# Patient Record
Sex: MALE | Race: OTHER | HISPANIC OR LATINO | Employment: UNEMPLOYED | URBAN - METROPOLITAN AREA
[De-identification: names, ages, dates, MRNs, and addresses within clinical notes are randomized per-mention and may not be internally consistent; named-entity substitution may affect disease eponyms.]

---

## 2024-10-30 ENCOUNTER — HOSPITAL ENCOUNTER (EMERGENCY)
Facility: HOSPITAL | Age: 39
Discharge: HOME | End: 2024-10-30
Attending: EMERGENCY MEDICINE

## 2024-10-30 VITALS
HEIGHT: 68 IN | TEMPERATURE: 100 F | RESPIRATION RATE: 16 BRPM | DIASTOLIC BLOOD PRESSURE: 80 MMHG | BODY MASS INDEX: 27.28 KG/M2 | SYSTOLIC BLOOD PRESSURE: 138 MMHG | WEIGHT: 180 LBS | OXYGEN SATURATION: 98 % | HEART RATE: 100 BPM

## 2024-10-30 DIAGNOSIS — J18.9 PNEUMONIA OF LEFT LOWER LOBE DUE TO INFECTIOUS ORGANISM: Primary | ICD-10-CM

## 2024-10-30 DIAGNOSIS — R05.8 PRODUCTIVE COUGH: ICD-10-CM

## 2024-10-30 LAB
GROUP A STREP, MOLECULAR: NEGATIVE
INFLUENZA A, MOLECULAR: NEGATIVE
INFLUENZA B, MOLECULAR: NEGATIVE
SARS-COV-2 RDRP RESP QL NAA+PROBE: NEGATIVE
SPECIMEN SOURCE: NORMAL

## 2024-10-30 PROCEDURE — 25000003 PHARM REV CODE 250

## 2024-10-30 PROCEDURE — 87635 SARS-COV-2 COVID-19 AMP PRB: CPT

## 2024-10-30 PROCEDURE — 99284 EMERGENCY DEPT VISIT MOD MDM: CPT | Mod: 25

## 2024-10-30 PROCEDURE — 87651 STREP A DNA AMP PROBE: CPT

## 2024-10-30 PROCEDURE — 87502 INFLUENZA DNA AMP PROBE: CPT

## 2024-10-30 RX ORDER — DOXYCYCLINE 100 MG/1
100 CAPSULE ORAL 2 TIMES DAILY
Qty: 10 CAPSULE | Refills: 0 | Status: SHIPPED | OUTPATIENT
Start: 2024-10-30 | End: 2024-11-04

## 2024-10-30 RX ORDER — IBUPROFEN 200 MG
600 TABLET ORAL
Status: COMPLETED | OUTPATIENT
Start: 2024-10-30 | End: 2024-10-30

## 2024-10-30 RX ORDER — ACETAMINOPHEN 325 MG/1
650 TABLET ORAL
Status: COMPLETED | OUTPATIENT
Start: 2024-10-30 | End: 2024-10-30

## 2024-10-30 RX ORDER — AMOXICILLIN 500 MG/1
1000 CAPSULE ORAL EVERY 8 HOURS
Qty: 30 CAPSULE | Refills: 0 | Status: SHIPPED | OUTPATIENT
Start: 2024-10-30 | End: 2024-10-30

## 2024-10-30 RX ORDER — DOXYCYCLINE 100 MG/1
100 CAPSULE ORAL 2 TIMES DAILY
Qty: 10 CAPSULE | Refills: 0 | Status: SHIPPED | OUTPATIENT
Start: 2024-10-30 | End: 2024-10-30

## 2024-10-30 RX ORDER — AMOXICILLIN 500 MG/1
1000 CAPSULE ORAL EVERY 8 HOURS
Qty: 30 CAPSULE | Refills: 0 | Status: SHIPPED | OUTPATIENT
Start: 2024-10-30 | End: 2024-11-04

## 2024-10-30 RX ADMIN — IBUPROFEN 600 MG: 200 TABLET, FILM COATED ORAL at 04:10

## 2024-10-30 RX ADMIN — ACETAMINOPHEN 650 MG: 325 TABLET ORAL at 06:10

## 2024-10-30 NOTE — Clinical Note
"Lars Reynolds" Gregg Lemons was seen and treated in our emergency department on 10/30/2024.  He may return to work on 11/09/2024.       If you have any questions or concerns, please don't hesitate to call.      Vesta Bustillo PA-C"

## 2024-10-30 NOTE — DISCHARGE INSTRUCTIONS
Regrese al departamento de emergencias si no controla la fiebre, presenta dificultad para respirar o dolor en el pecho.  Inola jasper antibióticos según lo recetado. puede realizar un seguimiento con alvarez proveedor de atención primaria para garantizar la resolución de los síntomas.  Si no tiene osmany, he adjuntado un número de teléfono a alvarez documentación de adi.

## 2024-10-31 NOTE — ED PROVIDER NOTES
Encounter Date: 10/30/2024       History     Chief Complaint   Patient presents with    Fever     Fever off and on since last week. Coughing as well     Cough     39-year-old male no significant past medical history presents to the emergency department for intermittent fever with a cough since last week.  Patient states that in the beginning he was coughing up green but now it does not seem to have a color.  Reports that he did not actually check his temperature but that he felt very hot and had cold sweats.  He has not had any nausea, vomiting, diarrhea, blood in his stool, recent travel, sick contacts, shortness of breath, chest pain        Review of patient's allergies indicates:  No Known Allergies  No past medical history on file.  No past surgical history on file.  No family history on file.     Review of Systems   Constitutional:  Positive for fever.   HENT:  Positive for sore throat.    Respiratory:  Positive for cough.    Gastrointestinal: Negative.    Genitourinary: Negative.    Musculoskeletal: Negative.    Neurological: Negative.        Physical Exam     Initial Vitals [10/30/24 1607]   BP Pulse Resp Temp SpO2   (!) 160/88 75 18 (!) 100.6 °F (38.1 °C) 99 %      MAP       --         Physical Exam    Vitals reviewed.  Constitutional: He appears well-developed and well-nourished. He is not diaphoretic. No distress.   HENT:   Right Ear: External ear normal.   Left Ear: External ear normal. Mouth/Throat: Oropharynx is clear and moist. No oropharyngeal exudate.   Eyes: Conjunctivae and EOM are normal. Pupils are equal, round, and reactive to light.   Neck:   Normal range of motion.  Cardiovascular:  Normal rate, regular rhythm, normal heart sounds and intact distal pulses.           Pulmonary/Chest: Breath sounds normal. No respiratory distress. He has no wheezes. He has no rhonchi. He has no rales.   Abdominal: Abdomen is soft. He exhibits no distension. There is no abdominal tenderness. There is no rebound  and no guarding.   Musculoskeletal:         General: No tenderness or edema. Normal range of motion.      Cervical back: Normal range of motion.     Lymphadenopathy:     He has cervical adenopathy.   Neurological: He is alert and oriented to person, place, and time. He has normal strength. GCS score is 15. GCS eye subscore is 4. GCS verbal subscore is 5. GCS motor subscore is 6.   Skin: Capillary refill takes less than 2 seconds.         ED Course   Procedures  Labs Reviewed   GROUP A STREP, MOLECULAR       Result Value    Group A Strep, Molecular Negative     SARS-COV-2 RNA AMPLIFICATION, QUAL    SARS-CoV-2 RNA, Amplification, Qual Negative     INFLUENZA A AND B ANTIGEN    Influenza A, Molecular Negative      Influenza B, Molecular Negative      Flu A & B Source Nasal swab      Narrative:     Specimen Source->Nasopharyngeal Swab          Imaging Results              X-Ray Chest PA And Lateral (Final result)  Result time 10/30/24 17:29:07      Final result by Jaison Valero Jr., MD (10/30/24 17:29:07)                   Impression:      Left lingular infiltrate consistent with pneumonia.      Electronically signed by: Jaison Valero MD  Date:    10/30/2024  Time:    17:29               Narrative:    EXAMINATION:  XR CHEST PA AND LATERAL    CLINICAL HISTORY:  Other specified cough    TECHNIQUE:  PA and lateral views of the chest were performed.    COMPARISON:  None    FINDINGS:  There is a left lingular soft tissue density consistent with pneumonia.  The cardiac size is within normal limits.  The right lung is clear.  No pneumothorax or pleural effusion is noted.                                       Medications   ibuprofen tablet 600 mg (600 mg Oral Given 10/30/24 1643)   acetaminophen tablet 650 mg (650 mg Oral Given 10/30/24 1810)     Medical Decision Making  39-year-old male no significant past medical history presents to the emergency department for intermittent fever with a cough since last week.  Patient  states that in the beginning he was coughing up green but now it does not seem to have a color.  Reports that he did not actually check his temperature but that he felt very hot and had cold sweats.  He has not had any nausea, vomiting, diarrhea, blood in his stool, recent travel, sick contacts, shortness of breath, chest pain    Considerations include but not limited to COVID, influenza, strep, viral illness, pneumonia, bronchitis    Vitals stable.  Temp at 100.6°.  Patient given a dose of Tylenol and ibuprofen here in the emergency department.  Temp at discharge was 99.8.  Oropharynx was clear and moist.  Nonerythematous and without exudate.  Airway was patent.  No swelling noted in the oropharynx.  Tympanic membranes visualized bilaterally without bulging or erythema.  Normal S1, S2.  Lungs with a vesicular breath sounds throughout.  No wheezes, rales, rhonchi.  Given the patient's fever and cough persistent for a week chest x-ray performed showing left lingular infiltrate consistent with pneumonia.  Patient placed on 2 antibiotics and given strict return precautions regarding shortness of breath, chest pain, worsening fever or other symptoms.  He verbalizes understanding of the plan and agreed.  Plan also discussed with my attending who spoke to the patient himself.  All questions were answered at the bedside.    Amount and/or Complexity of Data Reviewed  Radiology: ordered.    Risk  OTC drugs.  Prescription drug management.                                      Clinical Impression:  Final diagnoses:  [R05.8] Productive cough  [J18.9] Pneumonia of left lower lobe due to infectious organism (Primary)          ED Disposition Condition    Discharge Stable          ED Prescriptions       Medication Sig Dispense Start Date End Date Auth. Provider    amoxicillin (AMOXIL) 500 MG capsule  (Status: Discontinued) Take 2 capsules (1,000 mg total) by mouth every 8 (eight) hours. for 5 days 30 capsule 10/30/2024 10/30/2024  Vesta Bustillo PA-C    doxycycline (VIBRAMYCIN) 100 MG Cap  (Status: Discontinued) Take 1 capsule (100 mg total) by mouth 2 (two) times daily. for 5 days 10 capsule 10/30/2024 10/30/2024 Vesta Bustillo PA-C    amoxicillin (AMOXIL) 500 MG capsule  (Status: Discontinued) Take 2 capsules (1,000 mg total) by mouth every 8 (eight) hours. for 5 days 30 capsule 10/30/2024 10/30/2024 Vesta Bustillo PA-C    doxycycline (VIBRAMYCIN) 100 MG Cap  (Status: Discontinued) Take 1 capsule (100 mg total) by mouth 2 (two) times daily. for 5 days 10 capsule 10/30/2024 10/30/2024 Vesta Bustillo PA-C    amoxicillin (AMOXIL) 500 MG capsule Take 2 capsules (1,000 mg total) by mouth every 8 (eight) hours. for 5 days 30 capsule 10/30/2024 11/4/2024 Vesta Bustillo PA-C    doxycycline (VIBRAMYCIN) 100 MG Cap Take 1 capsule (100 mg total) by mouth 2 (two) times daily. for 5 days 10 capsule 10/30/2024 11/4/2024 Vesta Bustillo PA-C          Follow-up Information       Follow up With Specialties Details Why [facility]  Call   [address] [place] [id number]  [phone]               Vesta Bustillo PA-C  10/31/24 0110

## 2025-01-15 ENCOUNTER — OFFICE VISIT (OUTPATIENT)
Dept: URGENT CARE | Facility: CLINIC | Age: 40
End: 2025-01-15

## 2025-01-15 VITALS
TEMPERATURE: 99 F | BODY MASS INDEX: 27.28 KG/M2 | HEART RATE: 87 BPM | OXYGEN SATURATION: 99 % | DIASTOLIC BLOOD PRESSURE: 86 MMHG | WEIGHT: 180 LBS | SYSTOLIC BLOOD PRESSURE: 132 MMHG | HEIGHT: 68 IN | RESPIRATION RATE: 17 BRPM

## 2025-01-15 DIAGNOSIS — T14.8XXA PUNCTURE WOUND: Primary | ICD-10-CM

## 2025-01-15 DIAGNOSIS — M79.5 FOREIGN BODY (FB) IN SOFT TISSUE: ICD-10-CM

## 2025-01-15 PROCEDURE — 99214 OFFICE O/P EST MOD 30 MIN: CPT | Mod: TIER,25,S$GLB,

## 2025-01-15 PROCEDURE — 90715 TDAP VACCINE 7 YRS/> IM: CPT | Mod: S$GLB,,,

## 2025-01-15 PROCEDURE — 73552 X-RAY EXAM OF FEMUR 2/>: CPT | Mod: LT,S$GLB,, | Performed by: RADIOLOGY

## 2025-01-15 PROCEDURE — 90471 IMMUNIZATION ADMIN: CPT | Mod: S$GLB,,,

## 2025-01-15 RX ORDER — CEPHALEXIN 500 MG/1
500 CAPSULE ORAL EVERY 6 HOURS
Qty: 28 CAPSULE | Refills: 0 | Status: SHIPPED | OUTPATIENT
Start: 2025-01-15 | End: 2025-01-22

## 2025-01-15 RX ORDER — MUPIROCIN 20 MG/G
OINTMENT TOPICAL 3 TIMES DAILY
Qty: 30 G | Refills: 0 | Status: SHIPPED | OUTPATIENT
Start: 2025-01-15

## 2025-01-15 NOTE — PATIENT INSTRUCTIONS
Monitor area for worsening signs of infection including warmth, drainage, redness.  Please go directly to the ER if this occurs    Antibiotics as prescribed    Warm compresses as discussed in antibiotic ointment twice a day.

## 2025-01-15 NOTE — PROGRESS NOTES
"Subjective:      Patient ID: Lars Lemons is a 39 y.o. male.    Vitals:  height is 5' 8" (1.727 m) and weight is 81.6 kg (180 lb). His oral temperature is 98.9 °F (37.2 °C). His blood pressure is 132/86 and his pulse is 87. His respiration is 17 and oxygen saturation is 99%.     Chief Complaint: Leg Injury    39-year-old male presents for evaluation of a nail puncture wound.  Puncture wound is in the distal left thigh just above the knee.  He reports his happened 2 days ago.  There is mild erythema around the area.  No drainage noted.  Patient states there is a small area tenderness just above the puncture wound.  Periwound area is soft.  Upon x-ray review there is what appears to be a small piece of wire retained in his skin.  Attempted to palpate or express foreign body with no results.  Discussed with patient this may naturally come out on its own.  Patient said the nail has a wire on it which helps hold the nail in place.    Leg Pain   The incident occurred 2 days ago. The injury mechanism was a direct blow. The pain is present in the left thigh. The quality of the pain is described as aching. The pain is at a severity of 6/10. The pain is moderate. The pain has been Constant since onset. Associated symptoms include an inability to bear weight. He reports no foreign bodies present. The symptoms are aggravated by movement and weight bearing. He has tried NSAIDs and acetaminophen for the symptoms. The treatment provided no relief.       Musculoskeletal:  Positive for pain (top of thigh).   Skin:  Positive for puncture wound and erythema.      Objective:     Physical Exam   Constitutional: He is oriented to person, place, and time. No distress. normal  HENT:   Head: Normocephalic and atraumatic.   Cardiovascular: Normal rate.   Pulmonary/Chest: Effort normal. No respiratory distress.   Abdominal: Normal appearance.   Musculoskeletal:         General: Swelling, tenderness and signs of injury present. "      Left upper leg: He exhibits tenderness and swelling.        Legs:    Neurological: He is alert and oriented to person, place, and time. He displays no weakness.   Skin: Skin is warm and dry. Capillary refill takes less than 2 seconds. erythema         Comments: Nail puncture wound left thigh     Psychiatric: His behavior is normal. Mood, judgment and thought content normal.   Nursing note and vitals reviewed.      Assessment:     1. Puncture wound    2. Foreign body (FB) in soft tissue        Plan:       Puncture wound  -     XR FEMUR 2 VIEW LEFT; Future; Expected date: 01/15/2025  -     Tdap (BOOSTRIX) vaccine injection 0.5 mL  -     cephALEXin (KEFLEX) 500 MG capsule; Take 1 capsule (500 mg total) by mouth every 6 (six) hours. for 7 days  Dispense: 28 capsule; Refill: 0  -     mupirocin (BACTROBAN) 2 % ointment; Apply topically 3 (three) times daily.  Dispense: 30 g; Refill: 0    Foreign body (FB) in soft tissue      Tdap updated in clinic  Independent review of x-ray shows small retained foreign body consistent with a wire the patient described on the nail.     Discussed medication with patient who acknowledges understanding and is agreeable to POC. Follow up with primary care. Increase fluid intake. Red flags for ER discussed.